# Patient Record
Sex: MALE | Race: WHITE | NOT HISPANIC OR LATINO | Employment: FULL TIME | URBAN - METROPOLITAN AREA
[De-identification: names, ages, dates, MRNs, and addresses within clinical notes are randomized per-mention and may not be internally consistent; named-entity substitution may affect disease eponyms.]

---

## 2020-10-14 ENCOUNTER — LAB (OUTPATIENT)
Dept: LAB | Facility: HOSPITAL | Age: 50
End: 2020-10-14

## 2020-10-14 ENCOUNTER — OFFICE VISIT (OUTPATIENT)
Dept: ENDOCRINOLOGY | Facility: CLINIC | Age: 50
End: 2020-10-14

## 2020-10-14 VITALS
SYSTOLIC BLOOD PRESSURE: 122 MMHG | BODY MASS INDEX: 26.96 KG/M2 | TEMPERATURE: 98 F | DIASTOLIC BLOOD PRESSURE: 84 MMHG | HEIGHT: 69 IN | HEART RATE: 78 BPM | WEIGHT: 182 LBS

## 2020-10-14 DIAGNOSIS — IMO0002 DIABETES MELLITUS TYPE 1, UNCONTROLLED, WITH COMPLICATIONS: ICD-10-CM

## 2020-10-14 DIAGNOSIS — E10.649 TYPE 1 DIABETES MELLITUS WITH HYPOGLYCEMIA AND WITHOUT COMA (HCC): Primary | ICD-10-CM

## 2020-10-14 DIAGNOSIS — Z96.41 PRESENCE OF INSULIN PUMP: ICD-10-CM

## 2020-10-14 LAB
ALBUMIN SERPL-MCNC: 4.5 G/DL (ref 3.5–5.2)
ALBUMIN UR-MCNC: <1.2 MG/DL
ALBUMIN/GLOB SERPL: 1.6 G/DL
ALP SERPL-CCNC: 75 U/L (ref 39–117)
ALT SERPL W P-5'-P-CCNC: 23 U/L (ref 1–41)
ANION GAP SERPL CALCULATED.3IONS-SCNC: 8.8 MMOL/L (ref 5–15)
AST SERPL-CCNC: 22 U/L (ref 1–40)
BILIRUB SERPL-MCNC: 1.1 MG/DL (ref 0–1.2)
BUN SERPL-MCNC: 14 MG/DL (ref 6–20)
BUN/CREAT SERPL: 13.3 (ref 7–25)
CALCIUM SPEC-SCNC: 9.3 MG/DL (ref 8.6–10.5)
CHLORIDE SERPL-SCNC: 101 MMOL/L (ref 98–107)
CHOLEST SERPL-MCNC: 174 MG/DL (ref 0–200)
CO2 SERPL-SCNC: 31.2 MMOL/L (ref 22–29)
CREAT SERPL-MCNC: 1.05 MG/DL (ref 0.76–1.27)
CREAT UR-MCNC: 161.9 MG/DL
DEPRECATED RDW RBC AUTO: 40.8 FL (ref 37–54)
ERYTHROCYTE [DISTWIDTH] IN BLOOD BY AUTOMATED COUNT: 13 % (ref 12.3–15.4)
EXPIRATION DATE: NORMAL
GFR SERPL CREATININE-BSD FRML MDRD: 75 ML/MIN/1.73
GLOBULIN UR ELPH-MCNC: 2.8 GM/DL
GLUCOSE SERPL-MCNC: 115 MG/DL (ref 65–99)
HBA1C MFR BLD: 5.9 %
HCT VFR BLD AUTO: 45.4 % (ref 37.5–51)
HDLC SERPL-MCNC: 55 MG/DL (ref 40–60)
HGB BLD-MCNC: 15.4 G/DL (ref 13–17.7)
LDLC SERPL CALC-MCNC: 105 MG/DL (ref 0–100)
LDLC/HDLC SERPL: 1.89 {RATIO}
Lab: NORMAL
MCH RBC QN AUTO: 29.5 PG (ref 26.6–33)
MCHC RBC AUTO-ENTMCNC: 33.9 G/DL (ref 31.5–35.7)
MCV RBC AUTO: 87 FL (ref 79–97)
MICROALBUMIN/CREAT UR: NORMAL MG/G{CREAT}
PLATELET # BLD AUTO: 262 10*3/MM3 (ref 140–450)
PMV BLD AUTO: 9.8 FL (ref 6–12)
POTASSIUM SERPL-SCNC: 3.8 MMOL/L (ref 3.5–5.2)
PROT SERPL-MCNC: 7.3 G/DL (ref 6–8.5)
RBC # BLD AUTO: 5.22 10*6/MM3 (ref 4.14–5.8)
SODIUM SERPL-SCNC: 141 MMOL/L (ref 136–145)
TRIGL SERPL-MCNC: 76 MG/DL (ref 0–150)
TSH SERPL DL<=0.05 MIU/L-ACNC: 1.38 UIU/ML (ref 0.27–4.2)
VLDLC SERPL-MCNC: 14 MG/DL (ref 5–40)
WBC # BLD AUTO: 7.09 10*3/MM3 (ref 3.4–10.8)

## 2020-10-14 PROCEDURE — 82570 ASSAY OF URINE CREATININE: CPT | Performed by: PHYSICIAN ASSISTANT

## 2020-10-14 PROCEDURE — 85027 COMPLETE CBC AUTOMATED: CPT | Performed by: PHYSICIAN ASSISTANT

## 2020-10-14 PROCEDURE — 83036 HEMOGLOBIN GLYCOSYLATED A1C: CPT | Performed by: PHYSICIAN ASSISTANT

## 2020-10-14 PROCEDURE — 82043 UR ALBUMIN QUANTITATIVE: CPT | Performed by: PHYSICIAN ASSISTANT

## 2020-10-14 PROCEDURE — 80053 COMPREHEN METABOLIC PANEL: CPT | Performed by: PHYSICIAN ASSISTANT

## 2020-10-14 PROCEDURE — 80061 LIPID PANEL: CPT | Performed by: PHYSICIAN ASSISTANT

## 2020-10-14 PROCEDURE — 84443 ASSAY THYROID STIM HORMONE: CPT | Performed by: PHYSICIAN ASSISTANT

## 2020-10-14 PROCEDURE — 99213 OFFICE O/P EST LOW 20 MIN: CPT | Performed by: PHYSICIAN ASSISTANT

## 2020-10-14 PROCEDURE — 36415 COLL VENOUS BLD VENIPUNCTURE: CPT

## 2020-10-14 NOTE — PROGRESS NOTES
"     Office Note      Date: 10/14/2020  Patient Name: Stuart Guerrero  MRN: [id number]  : 1970    Chief Complaint   Patient presents with   • Diabetes       History of Present Illness:   Stuart Guerrero is a 50 y.o. male who presents today for type 1 diabetes. He remains on Medtronic 530G pump. He denies any problems with the pump. He is testing 7-10x per day. He notes occasional hypoglycemia. He denies any severe hypoglycemia, but has had FSBS in the low 40s. He reports higher fasting FSBS and after meals.  He made some pump adjustments.  He denies any problems with his feet. Eye exam up to date.  He reports a little decrease in strength of urine stream. He says this is stable.  He has had prostate exam.      Subjective      Review of Systems:   Review of Systems   Constitutional: Negative for activity change, appetite change, chills, diaphoresis, fatigue, fever and unexpected weight change.   Cardiovascular: Negative for chest pain, palpitations and leg swelling.   Gastrointestinal: Negative for abdominal distention, abdominal pain, constipation, diarrhea, nausea and vomiting.   Endocrine: Negative for cold intolerance, heat intolerance, polydipsia, polyphagia and polyuria.   Genitourinary: Positive for difficulty urinating.       The following portions of the patient's history were reviewed and updated as appropriate: allergies, current medications, past family history, past medical history, past social history, past surgical history and problem list.    Objective     Vitals:    10/14/20 0841   BP: 122/84   BP Location: Left arm   Patient Position: Sitting   Cuff Size: Adult   Pulse: 78   Temp: 98 °F (36.7 °C)   TempSrc: Infrared   Weight: 82.6 kg (182 lb)   Height: 175.3 cm (69\")   PainSc: 0-No pain     Body mass index is 26.88 kg/m².    Physical Exam  Vitals signs reviewed.   Constitutional:       General: He is not in acute distress.     Appearance: Normal appearance.   Neurological:      " Mental Status: He is alert.   Psychiatric:         Mood and Affect: Mood and affect normal.         HEMOGLOBIN A1C  Lab Results   Component Value Date    HGBA1C 5.9 10/14/2020         Current Outpatient Medications   Medication Instructions   • glucose blood (Contour Next Test) test strip Test up to 9 times daily   • glucose blood test strip USE TO test blood sugar 8-9 times a DAY   • NovoLOG 100 UNIT/ML injection USE AS DIRECTED IN INSULIN PUMP -MAX 66 UNITS DAILY-       Assessment / Plan      Assessment & Plan:  1. Type 1 diabetes mellitus with hypoglycemia and without coma (CMS/formerly [facility])  A1c okay, but concern for frequent hypoglycemia with A1c this low. Surprised by A1c given higher readings than usual. Check CBC today to rule out anemia. Reviewed pump download and discussed with pt. Adjust ISF to 55 from 10p-2a. Rest of labs pending.  Will notify him of results.   - POC Glycosylated Hemoglobin (Hb A1C)  - Comprehensive Metabolic Panel; Future  - CBC (No Diff); Future  - TSH; Future  - Microalbumin / Creatinine Urine Ratio - Urine, Clean Catch; Future  - Lipid Panel; Future    2. Presence of insulin pump      Return in about 6 months (around 4/14/2021) for Next scheduled follow up.     REBECCA Bacon  10/14/2020

## 2020-10-15 RX ORDER — PERPHENAZINE 16 MG/1
TABLET, FILM COATED ORAL
Qty: 300 EACH | Refills: 11 | Status: SHIPPED | OUTPATIENT
Start: 2020-10-15 | End: 2021-09-13 | Stop reason: SDUPTHER

## 2020-10-22 PROBLEM — E10.9 TYPE 1 DIABETES MELLITUS WITHOUT COMPLICATION: Status: ACTIVE | Noted: 2020-10-22

## 2020-10-22 PROBLEM — Z96.41 PRESENCE OF INSULIN PUMP: Status: ACTIVE | Noted: 2020-10-22

## 2021-04-14 ENCOUNTER — OFFICE VISIT (OUTPATIENT)
Dept: ENDOCRINOLOGY | Facility: CLINIC | Age: 51
End: 2021-04-14

## 2021-04-14 VITALS
SYSTOLIC BLOOD PRESSURE: 130 MMHG | OXYGEN SATURATION: 97 % | WEIGHT: 188 LBS | HEIGHT: 69 IN | DIASTOLIC BLOOD PRESSURE: 76 MMHG | BODY MASS INDEX: 27.85 KG/M2 | TEMPERATURE: 97.1 F | HEART RATE: 60 BPM

## 2021-04-14 DIAGNOSIS — Z96.41 PRESENCE OF INSULIN PUMP: ICD-10-CM

## 2021-04-14 DIAGNOSIS — E10.649 TYPE 1 DIABETES MELLITUS WITH HYPOGLYCEMIA AND WITHOUT COMA (HCC): Primary | Chronic | ICD-10-CM

## 2021-04-14 PROBLEM — E10.9 TYPE 1 DIABETES MELLITUS WITHOUT COMPLICATION: Chronic | Status: ACTIVE | Noted: 2020-10-22

## 2021-04-14 LAB
EXPIRATION DATE: NORMAL
HBA1C MFR BLD: 6.2 %
Lab: NORMAL

## 2021-04-14 PROCEDURE — 83036 HEMOGLOBIN GLYCOSYLATED A1C: CPT | Performed by: PHYSICIAN ASSISTANT

## 2021-04-14 PROCEDURE — 99213 OFFICE O/P EST LOW 20 MIN: CPT | Performed by: PHYSICIAN ASSISTANT

## 2021-04-14 RX ORDER — GLUCAGON INJECTION, SOLUTION 1 MG/.2ML
1 INJECTION, SOLUTION SUBCUTANEOUS ONCE AS NEEDED
Qty: 0.4 ML | Refills: 0 | Status: SHIPPED | OUTPATIENT
Start: 2021-04-14

## 2021-04-14 RX ORDER — SUBCUTANEOUS INSULIN PUMP
EACH MISCELLANEOUS
COMMUNITY

## 2021-04-14 NOTE — PROGRESS NOTES
"     Office Note      Date: 2021  Patient Name: Stuart Guerrero  MRN: [id number]  : 1970    Chief Complaint   Patient presents with   • Diabetes       History of Present Illness:   Stuart Guerrero is a 50 y.o. male who presents today for type 1 diabetes.  He remains on Medtronic 530G pump.  Pump is out of warranty.  He is testing 7-10x per day.  He reports some hypoglycemia.  He denies any severe hypoglycemia, but has had readings in the 40s.  He does report some hypoglycemic unawareness.  He denies any problems with his feet.  Eye exam - he will check on date of last exam.  He reports developing N/V/D about 24h after wedding reception last weekend.  He thinks that it was food poisoning as others got sick as well.  He reports having trouble getting blood sugar up from the 40s - used syrup.  Then blood sugar went up to 200s and he had trouble keeping down for about a day.    Subjective      Review of Systems:   Review of Systems   Constitutional: Negative.    Cardiovascular: Negative.    Gastrointestinal: Positive for diarrhea, nausea and vomiting.        (resolved)   Endocrine: Negative.        The following portions of the patient's history were reviewed and updated as appropriate: allergies, current medications, past family history, past medical history, past social history, past surgical history and problem list.    Objective     Vitals:    21 0805   BP: 130/76   BP Location: Left arm   Patient Position: Sitting   Cuff Size: Adult   Pulse: 60   Temp: 97.1 °F (36.2 °C)   TempSrc: Infrared   SpO2: 97%   Weight: 85.3 kg (188 lb)   Height: 175.3 cm (69\")   PainSc: 0-No pain     Body mass index is 27.76 kg/m².    Physical Exam  Vitals reviewed.   Constitutional:       General: He is not in acute distress.  Cardiovascular:      Pulses:           Dorsalis pedis pulses are 2+ on the right side and 2+ on the left side.        Posterior tibial pulses are 2+ on the right side and 2+ on the left " side.   Musculoskeletal:      Right foot: No deformity.      Left foot: No deformity.   Feet:      Right foot:      Protective Sensation: 10 sites tested. 10 sites sensed.      Skin integrity: Skin integrity normal.      Toenail Condition: Right toenails are normal.      Left foot:      Protective Sensation: 10 sites tested. 10 sites sensed.      Skin integrity: Skin integrity normal.      Toenail Condition: Left toenails are normal.   Neurological:      Mental Status: He is alert and oriented to person, place, and time.   Psychiatric:         Mood and Affect: Affect normal.         HEMOGLOBIN A1C  Lab Results   Component Value Date    HGBA1C 6.2 04/14/2021    HGBA1C 5.9 10/14/2020       Current Outpatient Medications   Medication Instructions   • glucose blood (Contour Next Test) test strip Test up to 9 times daily   • Gvoke HypoPen 2-Pack 1 mg, Subcutaneous, Once As Needed   • Insulin Infusion Pump (MINIMED INSULIN PUMP) device MedOneTwoSee 530G/751: 12a 0.875 u/hr, 330a 0.875, 830a 0.55, 12p 0.475, 530p 0.5; carb ratio 12a 1:12 breakfast, 11a 1:14 lunch, 5p 1:12 dinner; target , ISF 45, AIT 3h   • NovoLOG 100 UNIT/ML injection MAX DAILY DOSE 66 UNITS VIA INSULIN PUMP       Assessment / Plan      Assessment & Plan:  1. Type 1 diabetes mellitus with hypoglycemia and without coma (CMS/[facility])  A1c at goal.  Reviewed pump download and discussed with patient.  He is having frequent hypoglycemia, particularly around noon.  Decrease basal at 8:30 AM to 0.55.  Pump is out of warranty.  He would benefit from CGM and hybrid closed-loop system.  We discussed available insulin pumps/CGM/hybrid closed-loop systems.  He is not interested in OmniPod, so MedOneTwoSee 770G system or Tandem Control-IQ system.  He will consider the options.  - POC Glycosylated Hemoglobin (Hb A1C)  - Glucagon (Gvoke HypoPen 2-Pack) 1 MG/0.2ML solution auto-injector; Inject 1 mg under the skin into the appropriate area as directed 1 (One) Time As Needed  (for severe hypoglycemia) for up to 1 dose.  Dispense: 0.4 mL; Refill: 0    2. Presence of insulin pump      Return in about 6 months (around 10/14/2021) for Fasting follow up.     REBECCA Bacon  Endocrinology  04/14/2021

## 2021-09-13 ENCOUNTER — TELEPHONE (OUTPATIENT)
Dept: ENDOCRINOLOGY | Facility: CLINIC | Age: 51
End: 2021-09-13

## 2021-09-13 RX ORDER — PERPHENAZINE 16 MG/1
TABLET, FILM COATED ORAL
Qty: 300 EACH | Refills: 11 | Status: SHIPPED | OUTPATIENT
Start: 2021-09-13 | End: 2022-06-20 | Stop reason: SDUPTHER

## 2021-09-13 NOTE — TELEPHONE ENCOUNTER
REMBERTO PT. AND ADVISED SHE IS GOING TO CHECK FASTING LABS, HE STATED HE HAD LIPIDS AND CMP DONE TODAY WITH PRIMARY CARE DOCTOR, I ASKED HIM TO HAVE THOSE RESULTS SENT TO OUR OFFICE SO WE DO NOT REPEAT THINGS THAT HAVE ALREADY BEEN DONE. PT. VOICED UNDERSTANDING

## 2021-09-13 NOTE — TELEPHONE ENCOUNTER
Pt called requesting his prescription to be sent to [facility] Contour Next Test Strips Novolog 100 unit/mL injection. Pt also wants to know what labs he is having done for his visit on 12/06/22 pt seeing primary soon don't want to repeat any labs. Please notify pt. Pt last seen 04/14/21

## 2021-12-06 ENCOUNTER — OFFICE VISIT (OUTPATIENT)
Dept: ENDOCRINOLOGY | Facility: CLINIC | Age: 51
End: 2021-12-06

## 2021-12-06 ENCOUNTER — LAB (OUTPATIENT)
Dept: LAB | Facility: HOSPITAL | Age: 51
End: 2021-12-06

## 2021-12-06 VITALS
SYSTOLIC BLOOD PRESSURE: 124 MMHG | HEART RATE: 71 BPM | DIASTOLIC BLOOD PRESSURE: 76 MMHG | WEIGHT: 191 LBS | OXYGEN SATURATION: 97 % | BODY MASS INDEX: 28.29 KG/M2 | HEIGHT: 69 IN

## 2021-12-06 DIAGNOSIS — E10.65 TYPE 1 DIABETES MELLITUS WITH HYPERGLYCEMIA (HCC): Primary | Chronic | ICD-10-CM

## 2021-12-06 DIAGNOSIS — E10.65 TYPE 1 DIABETES MELLITUS WITH HYPERGLYCEMIA (HCC): Chronic | ICD-10-CM

## 2021-12-06 DIAGNOSIS — Z96.41 PRESENCE OF INSULIN PUMP: ICD-10-CM

## 2021-12-06 DIAGNOSIS — E10.649 TYPE 1 DIABETES MELLITUS WITH HYPOGLYCEMIA AND WITHOUT COMA (HCC): ICD-10-CM

## 2021-12-06 LAB
EXPIRATION DATE: ABNORMAL
EXPIRATION DATE: NORMAL
GLUCOSE BLDC GLUCOMTR-MCNC: 62 MG/DL (ref 70–130)
HBA1C MFR BLD: 6 %
Lab: ABNORMAL
Lab: NORMAL

## 2021-12-06 PROCEDURE — 83036 HEMOGLOBIN GLYCOSYLATED A1C: CPT | Performed by: PHYSICIAN ASSISTANT

## 2021-12-06 PROCEDURE — 82947 ASSAY GLUCOSE BLOOD QUANT: CPT | Performed by: PHYSICIAN ASSISTANT

## 2021-12-06 PROCEDURE — 82570 ASSAY OF URINE CREATININE: CPT

## 2021-12-06 PROCEDURE — 82043 UR ALBUMIN QUANTITATIVE: CPT

## 2021-12-06 PROCEDURE — 84443 ASSAY THYROID STIM HORMONE: CPT

## 2021-12-06 PROCEDURE — 99213 OFFICE O/P EST LOW 20 MIN: CPT | Performed by: PHYSICIAN ASSISTANT

## 2021-12-06 PROCEDURE — 80053 COMPREHEN METABOLIC PANEL: CPT

## 2021-12-06 RX ORDER — INSULIN GLARGINE 100 [IU]/ML
INJECTION, SOLUTION SUBCUTANEOUS
Qty: 10 ML | Refills: 1 | Status: SHIPPED | OUTPATIENT
Start: 2021-12-06

## 2021-12-06 NOTE — PROGRESS NOTES
"     Office Note      Date: 2021  Patient Name: Stuart Guerrero  MRN: [id number]  : 1970    Chief Complaint   Patient presents with   • Diabetes       History of Present Illness:   Stuart Guerrero is a 51 y.o. male who presents today for follow up on type 1 diabetes.  Known diabetic complications: none.  He remains on Medtronic 530G pump.  Pump is out of warranty.  He is planning to change to Tandem pump with Dexcom G6 CGM and Control-IQ program.  He is testing blood sugars 7-10x per day.  He reports some hypoglycemia.  He denies any severe hypoglycemia, but has had readings in the 40s, lowest 37 rare occasions.  He does have hypoglycemic unawareness.  Current diet: in general, a \"healthy\" diet    Current exercise: intermittently, but stays active and does strength training.  Feet: No sores. He reports intermittent pain and hyperesthesia bilateral 2nd toes.  This occurred for about a month, resolved about 2 weeks ago.  He reports that he was having some unusually high blood sugars during that time.  Eye exam current (within one year): no, due. No retinopathy.  ACE inhibitor/ARB: Not Indicated.  Statin: He chooses not to take statin.  He had lipids checked in September.  Total cholesterol 174, , HDL 45, triglycerides 93.    Subjective      Review of Systems:   Review of Systems   Constitutional: Negative.    Cardiovascular: Negative.    Gastrointestinal: Negative.    Endocrine: Negative.        The following portions of the patient's history were reviewed and updated as appropriate: allergies, current medications, past family history, past medical history, past social history, past surgical history and problem list.    Objective     Vitals:    21 0859   BP: 124/76   Pulse: 71   SpO2: 97%   Weight: 86.6 kg (191 lb)   Height: 175.3 cm (69\")     Body mass index is 28.21 kg/m².    Physical Exam  Vitals reviewed.   Constitutional:       General: He is not in acute " distress.  Cardiovascular:      Pulses:           Dorsalis pedis pulses are 2+ on the right side and 2+ on the left side.        Posterior tibial pulses are 2+ on the right side and 2+ on the left side.   Musculoskeletal:      Right foot: No deformity.      Left foot: No deformity.   Feet:      Right foot:      Protective Sensation: 8 sites tested. 8 sites sensed.      Skin integrity: Skin integrity normal.      Toenail Condition: Right toenails are normal.      Left foot:      Protective Sensation: 8 sites tested. 8 sites sensed.      Skin integrity: Skin integrity normal.      Toenail Condition: Left toenails are normal.      Comments:   Neurological:      Mental Status: He is alert and oriented to person, place, and time.   Psychiatric:         Mood and Affect: Affect normal.         HEMOGLOBIN A1C  Lab Results   Component Value Date    HGBA1C 6.0 12/06/2021    HGBA1C 6.2 04/14/2021    HGBA1C 5.9 10/14/2020     GLUCOSE  Lab Results   Component Value Date    POCGLU 62 (A) 12/06/2021     CMP  Lab Results   Component Value Date    GLUCOSE 115 (H) 10/14/2020    BUN 14 10/14/2020    CREATININE 1.05 10/14/2020    EGFRIFNONA 75 10/14/2020    BCR 13.3 10/14/2020     10/14/2020    K 3.8 10/14/2020    CO2 31.2 (H) 10/14/2020    CALCIUM 9.3 10/14/2020    ALBUMIN 4.50 10/14/2020    BILITOT 1.1 10/14/2020    ALKPHOS 75 10/14/2020    AST 22 10/14/2020    ALT 23 10/14/2020     LIPID PANEL  Lab Results   Component Value Date    CHOL 174 10/14/2020    TRIG 76 10/14/2020    HDL 55 10/14/2020     (H) 10/14/2020     URINE MICROALBUMIN/CREATININE RATIO  Lab Results   Component Value Date    MALBCRERATIO  10/14/2020      Comment:      Unable to calculate     THYROID  Lab Results   Component Value Date    TSH 1.380 10/14/2020       Current Outpatient Medications   Medication Instructions   • Contour Next Test test strip Test up to 9 times daily   • Gvoke HypoPen 2-Pack 1 mg, Subcutaneous, Once As Needed   • Insulin  Infusion Pump (MINIMED INSULIN PUMP) device Medtronic 530G/751: 12a 0.875 u/hr, 330a 0.875, 830a 0.55, 12p 0.475, 530p 0.5; carb ratio 12a 1:12 breakfast, 11a 1:14 lunch, 5p 1:12 dinner; target , ISF 45, AIT 3h   • NovoLOG 100 UNIT/ML injection MAX DAILY DOSE 66 UNITS VIA INSULIN PUMP       Assessment / Plan      Assessment & Plan:  1. Type 1 diabetes mellitus with hyperglycemia (HCC)  A1c at goal.  He is having frequent mild hypoglycemia.  He likes to keep his blood sugars under very tight control.  He would definitely benefit from CGM.  Plan is to change to Tandem pump and Dexcom G6 CGM.  BP okay.  Backup in case of pump failure - he has syringes at home and will send in Rx for basal insulin today.  Glucose was low at 62 when he was being checked in.  He had a snack of peanut butter and crackers.  Refused blood sugar recheck in office and stated he would check with his glucometer prior to driving.  - POC Glucose, Blood  - POC Glycosylated Hemoglobin (Hb A1C)  - Microalbumin / Creatinine Urine Ratio - Urine, Clean Catch; Future  - Comprehensive Metabolic Panel; Future  - TSH; Future    2. Type 1 diabetes mellitus with hypoglycemia and without coma (HCC)  Plan to start CGM, hybrid closed-loop system.    3. Presence of insulin pump    Will notify him of pending lab results from today.    Return in about 6 months (around 6/6/2022) for recheck with A1c. He was advised to contact the office with any interval questions or concerns.    REBECCA Bacon  Endocrinology  12/06/2021

## 2021-12-07 LAB
ALBUMIN SERPL-MCNC: 4.4 G/DL (ref 3.5–5.2)
ALBUMIN UR-MCNC: <1.2 MG/DL
ALBUMIN/GLOB SERPL: 1.7 G/DL
ALP SERPL-CCNC: 76 U/L (ref 39–117)
ALT SERPL W P-5'-P-CCNC: 20 U/L (ref 1–41)
ANION GAP SERPL CALCULATED.3IONS-SCNC: 9.2 MMOL/L (ref 5–15)
AST SERPL-CCNC: 26 U/L (ref 1–40)
BILIRUB SERPL-MCNC: 0.7 MG/DL (ref 0–1.2)
BUN SERPL-MCNC: 20 MG/DL (ref 6–20)
BUN/CREAT SERPL: 17.9 (ref 7–25)
CALCIUM SPEC-SCNC: 9.4 MG/DL (ref 8.6–10.5)
CHLORIDE SERPL-SCNC: 105 MMOL/L (ref 98–107)
CO2 SERPL-SCNC: 31.8 MMOL/L (ref 22–29)
CREAT SERPL-MCNC: 1.12 MG/DL (ref 0.76–1.27)
CREAT UR-MCNC: 110.8 MG/DL
GFR SERPL CREATININE-BSD FRML MDRD: 69 ML/MIN/1.73
GLOBULIN UR ELPH-MCNC: 2.6 GM/DL
GLUCOSE SERPL-MCNC: 102 MG/DL (ref 65–99)
MICROALBUMIN/CREAT UR: NORMAL MG/G{CREAT}
POTASSIUM SERPL-SCNC: 4.7 MMOL/L (ref 3.5–5.2)
PROT SERPL-MCNC: 7 G/DL (ref 6–8.5)
SODIUM SERPL-SCNC: 146 MMOL/L (ref 136–145)
TSH SERPL DL<=0.05 MIU/L-ACNC: 2.53 UIU/ML (ref 0.27–4.2)

## 2022-06-20 ENCOUNTER — OFFICE VISIT (OUTPATIENT)
Dept: ENDOCRINOLOGY | Facility: CLINIC | Age: 52
End: 2022-06-20

## 2022-06-20 VITALS
BODY MASS INDEX: 28.29 KG/M2 | OXYGEN SATURATION: 98 % | WEIGHT: 191 LBS | DIASTOLIC BLOOD PRESSURE: 80 MMHG | HEART RATE: 58 BPM | HEIGHT: 69 IN | SYSTOLIC BLOOD PRESSURE: 120 MMHG

## 2022-06-20 DIAGNOSIS — E10.649 TYPE 1 DIABETES MELLITUS WITH HYPOGLYCEMIA AND WITHOUT COMA: ICD-10-CM

## 2022-06-20 DIAGNOSIS — Z96.41 PRESENCE OF INSULIN PUMP: ICD-10-CM

## 2022-06-20 DIAGNOSIS — E10.65 TYPE 1 DIABETES MELLITUS WITH HYPERGLYCEMIA: Primary | Chronic | ICD-10-CM

## 2022-06-20 LAB
EXPIRATION DATE: NORMAL
EXPIRATION DATE: NORMAL
GLUCOSE BLDC GLUCOMTR-MCNC: 124 MG/DL (ref 70–130)
HBA1C MFR BLD: 6.2 %
Lab: NORMAL
Lab: NORMAL

## 2022-06-20 PROCEDURE — 83036 HEMOGLOBIN GLYCOSYLATED A1C: CPT | Performed by: PHYSICIAN ASSISTANT

## 2022-06-20 PROCEDURE — 82947 ASSAY GLUCOSE BLOOD QUANT: CPT | Performed by: PHYSICIAN ASSISTANT

## 2022-06-20 PROCEDURE — 99213 OFFICE O/P EST LOW 20 MIN: CPT | Performed by: PHYSICIAN ASSISTANT

## 2022-06-20 RX ORDER — INSULIN ASPART 100 [IU]/ML
INJECTION, SOLUTION INTRAVENOUS; SUBCUTANEOUS
Qty: 20 ML | Refills: 11 | Status: SHIPPED | OUTPATIENT
Start: 2022-06-20

## 2022-06-20 RX ORDER — PERPHENAZINE 16 MG/1
TABLET, FILM COATED ORAL
Qty: 300 EACH | Refills: 11 | Status: SHIPPED | OUTPATIENT
Start: 2022-06-20

## 2022-06-20 RX ORDER — BLOOD-GLUCOSE METER, WIRELESS
1 KIT MISCELLANEOUS TAKE AS DIRECTED
Qty: 1 KIT | Refills: 0 | Status: SHIPPED | OUTPATIENT
Start: 2022-06-20

## 2022-09-23 ENCOUNTER — TELEPHONE (OUTPATIENT)
Dept: ENDOCRINOLOGY | Facility: CLINIC | Age: 52
End: 2022-09-23

## 2022-09-23 NOTE — TELEPHONE ENCOUNTER
PATIENT CALLED TO SEE IF THERE ARE SAMPLES FOR HIS TANDEM PUMP. HE STATES THAT HE IS SWITCHING FROM MEDTRONIC TO TANDEM AND INSURANCE WILL NOT COVER SUPPLIES FOR TANDEM AT THIS TIME AS PATIENT RECENTLY ORDERED MEDTRONIC SUPPLIES.    CALL BACK [phone]

## 2022-10-01 ENCOUNTER — TELEPHONE (OUTPATIENT)
Dept: ENDOCRINOLOGY | Facility: CLINIC | Age: 52
End: 2022-10-01

## 2022-10-02 NOTE — TELEPHONE ENCOUNTER
Called with pump malfunction   Has basaglar- instructions previously to take 17 u daily   Discussed using novolog / pump insulin for pre meal and reviewed I:C of 1:15 and correction 50 (2 u /100 > 120)  He will call back if problems and has training new pump set up for next week  Thanks, [facility]

## 2023-04-11 ENCOUNTER — TELEPHONE (OUTPATIENT)
Dept: ENDOCRINOLOGY | Facility: CLINIC | Age: 53
End: 2023-04-11
Payer: COMMERCIAL

## 2023-04-11 RX ORDER — PROCHLORPERAZINE 25 MG/1
1 SUPPOSITORY RECTAL
Qty: 1 EACH | Refills: 0 | Status: SHIPPED | OUTPATIENT
Start: 2023-04-11 | End: 2023-04-18 | Stop reason: SDUPTHER

## 2023-04-11 RX ORDER — PROCHLORPERAZINE 25 MG/1
SUPPOSITORY RECTAL
Qty: 3 EACH | Refills: 0 | Status: SHIPPED | OUTPATIENT
Start: 2023-04-11 | End: 2023-04-18 | Stop reason: SDUPTHER

## 2023-04-11 NOTE — TELEPHONE ENCOUNTER
PATIENT IS REQUESTING RX FOR DEXCOM TRANSMITTER AND SENSORS TO BE SENT TO [facility] IN [place], [place].

## 2023-04-18 ENCOUNTER — OFFICE VISIT (OUTPATIENT)
Dept: ENDOCRINOLOGY | Facility: CLINIC | Age: 53
End: 2023-04-18
Payer: COMMERCIAL

## 2023-04-18 VITALS
SYSTOLIC BLOOD PRESSURE: 111 MMHG | HEIGHT: 69 IN | HEART RATE: 62 BPM | WEIGHT: 189 LBS | OXYGEN SATURATION: 97 % | DIASTOLIC BLOOD PRESSURE: 68 MMHG | BODY MASS INDEX: 27.99 KG/M2

## 2023-04-18 DIAGNOSIS — Z96.41 PRESENCE OF INSULIN PUMP: ICD-10-CM

## 2023-04-18 DIAGNOSIS — E10.649 TYPE 1 DIABETES MELLITUS WITH HYPOGLYCEMIA AND WITHOUT COMA: Primary | ICD-10-CM

## 2023-04-18 LAB
ALBUMIN SERPL-MCNC: 4.5 G/DL (ref 3.5–5.2)
ALBUMIN UR-MCNC: <1.2 MG/DL
ALBUMIN/GLOB SERPL: 1.7 G/DL
ALP SERPL-CCNC: 75 U/L (ref 39–117)
ALT SERPL W P-5'-P-CCNC: 22 U/L (ref 1–41)
ANION GAP SERPL CALCULATED.3IONS-SCNC: 11.4 MMOL/L (ref 5–15)
AST SERPL-CCNC: 29 U/L (ref 1–40)
BILIRUB SERPL-MCNC: 0.9 MG/DL (ref 0–1.2)
BUN SERPL-MCNC: 21 MG/DL (ref 6–20)
BUN/CREAT SERPL: 20.4 (ref 7–25)
CALCIUM SPEC-SCNC: 9.5 MG/DL (ref 8.6–10.5)
CHLORIDE SERPL-SCNC: 105 MMOL/L (ref 98–107)
CHOLEST SERPL-MCNC: 183 MG/DL (ref 0–200)
CO2 SERPL-SCNC: 26.6 MMOL/L (ref 22–29)
CREAT SERPL-MCNC: 1.03 MG/DL (ref 0.76–1.27)
CREAT UR-MCNC: 126.4 MG/DL
EGFRCR SERPLBLD CKD-EPI 2021: 87.9 ML/MIN/1.73
EXPIRATION DATE: NORMAL
EXPIRATION DATE: NORMAL
GLOBULIN UR ELPH-MCNC: 2.7 GM/DL
GLUCOSE BLDC GLUCOMTR-MCNC: 123 MG/DL (ref 70–130)
GLUCOSE SERPL-MCNC: 116 MG/DL (ref 65–99)
HBA1C MFR BLD: 5.9 %
HDLC SERPL-MCNC: 54 MG/DL (ref 40–60)
LDLC SERPL CALC-MCNC: 118 MG/DL (ref 0–100)
LDLC/HDLC SERPL: 2.17 {RATIO}
Lab: NORMAL
Lab: NORMAL
MICROALBUMIN/CREAT UR: NORMAL MG/G{CREAT}
POTASSIUM SERPL-SCNC: 4.6 MMOL/L (ref 3.5–5.2)
PROT SERPL-MCNC: 7.2 G/DL (ref 6–8.5)
SODIUM SERPL-SCNC: 143 MMOL/L (ref 136–145)
TRIGL SERPL-MCNC: 58 MG/DL (ref 0–150)
TSH SERPL DL<=0.05 MIU/L-ACNC: 2.07 UIU/ML (ref 0.27–4.2)
VLDLC SERPL-MCNC: 11 MG/DL (ref 5–40)

## 2023-04-18 PROCEDURE — 80061 LIPID PANEL: CPT | Performed by: PHYSICIAN ASSISTANT

## 2023-04-18 PROCEDURE — 83036 HEMOGLOBIN GLYCOSYLATED A1C: CPT | Performed by: PHYSICIAN ASSISTANT

## 2023-04-18 PROCEDURE — 84443 ASSAY THYROID STIM HORMONE: CPT | Performed by: PHYSICIAN ASSISTANT

## 2023-04-18 PROCEDURE — 82043 UR ALBUMIN QUANTITATIVE: CPT | Performed by: PHYSICIAN ASSISTANT

## 2023-04-18 PROCEDURE — 95251 CONT GLUC MNTR ANALYSIS I&R: CPT | Performed by: PHYSICIAN ASSISTANT

## 2023-04-18 PROCEDURE — 99213 OFFICE O/P EST LOW 20 MIN: CPT | Performed by: PHYSICIAN ASSISTANT

## 2023-04-18 PROCEDURE — 80053 COMPREHEN METABOLIC PANEL: CPT | Performed by: PHYSICIAN ASSISTANT

## 2023-04-18 PROCEDURE — 82570 ASSAY OF URINE CREATININE: CPT | Performed by: PHYSICIAN ASSISTANT

## 2023-04-18 RX ORDER — SUBCUTANEOUS INSULIN PUMP
EACH MISCELLANEOUS
COMMUNITY

## 2023-04-18 RX ORDER — PROCHLORPERAZINE 25 MG/1
1 SUPPOSITORY RECTAL
Qty: 1 EACH | Refills: 3 | Status: SHIPPED | OUTPATIENT
Start: 2023-04-18

## 2023-04-18 RX ORDER — INSULIN ASPART 100 [IU]/ML
INJECTION, SOLUTION INTRAVENOUS; SUBCUTANEOUS
Qty: 20 ML | Refills: 11 | Status: SHIPPED | OUTPATIENT
Start: 2023-04-18

## 2023-04-18 RX ORDER — PERPHENAZINE 16 MG/1
TABLET, FILM COATED ORAL
Qty: 100 EACH | Refills: 11 | Status: SHIPPED | OUTPATIENT
Start: 2023-04-18

## 2023-04-18 RX ORDER — PROCHLORPERAZINE 25 MG/1
SUPPOSITORY RECTAL
Qty: 9 EACH | Refills: 3 | Status: SHIPPED | OUTPATIENT
Start: 2023-04-18

## 2023-04-18 NOTE — PROGRESS NOTES
"     Office Note      Date: 2023  Patient Name: Stuart Guerrero  MRN: [id number]  : 1970    Chief Complaint   Patient presents with   • Diabetes       History of Present Illness:   Stuart Guerrero is a 52 y.o. male who presents for follow-up for type 1 diabetes diagnosed in .  He reports he switched over to the tandem insulin pump back in the fall and is using the Dexcom G6 continuous glucose monitor.  He reports he is using auto mode but often overrides this because he is frustrated with the higher blood sugar readings.  He reports he does like the Dexcom continuous glucose monitor rather than checking his blood glucose 7-10 times daily.  He reports he is constantly changing his pump settings if he noticed any patterns on the Dexcom.  He he was happy to hear his A1c today is 5.9%.  He does have some hypoglycemia but denies severe or frequent hypoglycemia.  He had his eye exam May 2022 and has received reminders recently to check back in.  He denies any trouble with his feet today.  He is fasting today for labs.      Subjective     Review of Systems:   Review of Systems   Constitutional: Negative.    Cardiovascular: Negative.    Gastrointestinal: Negative.    Endocrine: Negative.    Neurological: Negative.        The following portions of the patient's history were reviewed and updated as appropriate: allergies, current medications, past family history, past medical history, past social history, past surgical history and problem list.    Objective     Vitals:    23 0926   BP: 111/68   Pulse: 62   SpO2: 97%   Weight: 85.7 kg (189 lb)   Height: 175.3 cm (69\")     Body mass index is 27.91 kg/m².    Physical Exam  Vitals reviewed.   Constitutional:       General: He is not in acute distress.     Appearance: Normal appearance.   Neurological:      Mental Status: He is alert.         HEMOGLOBIN A1C  Lab Results   Component Value Date    HGBA1C 5.9 2023       GLUCOSE  Glucose   Date " Value Ref Range Status   04/18/2023 123 70 - 130 mg/dL Final           Assessment / Plan      Assessment & Plan:  1. Type 1 diabetes mellitus with hypoglycemia and without coma  His hemoglobin A1c is excellent at 5.9%.  I reviewed his insulin pump download with continuous glucose monitor readings and he does tend to have some more low blood sugars in the late afternoons and evenings.  We discussed being less aggressive with his corrections around this time.  We did not make any changes in his pump settings today.  I refilled his Dexcom supplies, test strips and NovoLog insulin today.  Fasting labs pending today.  Will send note with results and plan.  - POC Glucose, Blood  - POC Glycosylated Hemoglobin (Hb A1C)  - Contour Next Test test strip; Test up to 3 times daily  Dispense: 100 each; Refill: 11  - NovoLOG 100 UNIT/ML injection; MAX DAILY DOSE 66 UNITS VIA INSULIN PUMP  Dispense: 20 mL; Refill: 11  - TSH; Future  - Microalbumin / Creatinine Urine Ratio - Urine, Clean Catch; Future  - Comprehensive Metabolic Panel; Future  - Lipid Panel; Future  - TSH  - Microalbumin / Creatinine Urine Ratio - Urine, Clean Catch  - Comprehensive Metabolic Panel  - Lipid Panel    2. Presence of insulin pump      Return in about 6 months (around 10/18/2023) for Recheck usually sees Ellyn Slade.     Angela Rivera PA-C  04/18/2023

## 2023-04-26 ENCOUNTER — TELEPHONE (OUTPATIENT)
Dept: ENDOCRINOLOGY | Facility: CLINIC | Age: 53
End: 2023-04-26
Payer: COMMERCIAL

## 2024-01-05 DIAGNOSIS — E10.649 TYPE 1 DIABETES MELLITUS WITH HYPOGLYCEMIA AND WITHOUT COMA: ICD-10-CM

## 2024-01-05 RX ORDER — INSULIN GLARGINE 100 [IU]/ML
INJECTION, SOLUTION SUBCUTANEOUS
Qty: 10 ML | Refills: 1 | Status: SHIPPED | OUTPATIENT
Start: 2024-01-05

## 2024-01-05 RX ORDER — INSULIN ASPART 100 [IU]/ML
INJECTION, SOLUTION INTRAVENOUS; SUBCUTANEOUS
Qty: 20 ML | Refills: 11 | Status: SHIPPED | OUTPATIENT
Start: 2024-01-05

## 2024-01-05 RX ORDER — PROCHLORPERAZINE 25 MG/1
SUPPOSITORY RECTAL
Qty: 3 EACH | Refills: 0 | Status: SHIPPED | OUTPATIENT
Start: 2024-01-05

## 2024-01-05 RX ORDER — PERPHENAZINE 16 MG/1
TABLET, FILM COATED ORAL
Qty: 100 EACH | Refills: 11 | Status: SHIPPED | OUTPATIENT
Start: 2024-01-05

## 2024-01-05 NOTE — TELEPHONE ENCOUNTER
Rx Refill Note  Requested Prescriptions     Pending Prescriptions Disp Refills    insulin glargine (Lantus) 100 UNIT/ML injection 10 mL 1     Sig: PRN off insulin pump, 17 units once daily    NovoLOG 100 UNIT/ML injection 20 mL 11     Sig: MAX DAILY DOSE 66 UNITS VIA INSULIN PUMP     Signed Prescriptions Disp Refills    Continuous Blood Gluc Sensor (Dexcom G6 Sensor) 3 each 0     Sig: Use Every 10 (Ten) Days.     Authorizing Provider: KAYLYN SUAREZ     Ordering User: PRAMOD HANNAH    Contour Next Test test strip 100 each 11     Sig: Test up to 3 times daily     Authorizing Provider: KAYLYN SUAREZ     Ordering User: PRAMOD HANNAH      Last office visit with prescribing clinician: 4/18/2023     Next office visit with prescribing clinician: 1/9/2024       Pramod Hannah MA  01/05/24, 11:25 EST

## 2024-01-09 ENCOUNTER — OFFICE VISIT (OUTPATIENT)
Dept: ENDOCRINOLOGY | Facility: CLINIC | Age: 54
End: 2024-01-09
Payer: COMMERCIAL

## 2024-01-09 VITALS
DIASTOLIC BLOOD PRESSURE: 88 MMHG | OXYGEN SATURATION: 97 % | SYSTOLIC BLOOD PRESSURE: 142 MMHG | HEIGHT: 69 IN | BODY MASS INDEX: 28.14 KG/M2 | HEART RATE: 64 BPM | WEIGHT: 190 LBS

## 2024-01-09 DIAGNOSIS — E10.649 TYPE 1 DIABETES MELLITUS WITH HYPOGLYCEMIA AND WITHOUT COMA: Primary | ICD-10-CM

## 2024-01-09 DIAGNOSIS — E78.00 ELEVATED LDL CHOLESTEROL LEVEL: ICD-10-CM

## 2024-01-09 DIAGNOSIS — Z96.41 PRESENCE OF INSULIN PUMP: ICD-10-CM

## 2024-01-09 DIAGNOSIS — R03.0 ELEVATED BLOOD PRESSURE READING: ICD-10-CM

## 2024-01-09 LAB
EXPIRATION DATE: ABNORMAL
EXPIRATION DATE: NORMAL
GLUCOSE BLDC GLUCOMTR-MCNC: 96 MG/DL (ref 70–130)
HBA1C MFR BLD: 5.9 % (ref 4.5–5.7)
Lab: ABNORMAL
Lab: NORMAL

## 2024-01-09 RX ORDER — PROCHLORPERAZINE 25 MG/1
SUPPOSITORY RECTAL
Qty: 9 EACH | Refills: 3 | Status: SHIPPED | OUTPATIENT
Start: 2024-01-09

## 2024-01-09 RX ORDER — PROCHLORPERAZINE 25 MG/1
1 SUPPOSITORY RECTAL
Qty: 1 EACH | Refills: 3 | Status: SHIPPED | OUTPATIENT
Start: 2024-01-09

## 2024-01-09 RX ORDER — INSULIN ASPART 100 [IU]/ML
INJECTION, SOLUTION INTRAVENOUS; SUBCUTANEOUS
Qty: 60 ML | Refills: 3 | Status: SHIPPED | OUTPATIENT
Start: 2024-01-09

## 2024-01-09 NOTE — PROGRESS NOTES
"     Office Note      Date: 2024  Patient Name: Stuart Guerrero  MRN: [id number]  : 1970    Chief Complaint   Patient presents with    Diabetes     Type I       History of Present Illness:   Stuart Guerrero is a 53 y.o. male who presents for follow-up for type 1 diabetes diagnosed in .  He remains on the tandem insulin pump with Dexcom G6 continuous glucose monitor.  He reports overall he is doing well.  He makes frequent adjustments to his pump settings to try to get his diabetes under better control.  He reports he does have some hypoglycemia but typically this is because he becomes impatient and overcorrects.  He reports he is due for his eye exam and will get this scheduled.  We did fasting labs at his appointment in April and these were okay except for elevated LDL cholesterol.  He is not interested in considering a statin at this time.  He prefers to work on healthy eating habits.        Subjective     Review of Systems:   Review of Systems   Constitutional: Negative.    Cardiovascular: Negative.    Gastrointestinal: Negative.    Endocrine: Negative.    Neurological: Negative.        The following portions of the patient's history were reviewed and updated as appropriate: allergies, current medications, past family history, past medical history, past social history, past surgical history, and problem list.    Objective     Vitals:    24 0801   BP: 142/88   BP Location: Left arm   Patient Position: Sitting   Cuff Size: Adult   Pulse: 64   SpO2: 97%   Weight: 86.2 kg (190 lb)   Height: 175.3 cm (69\")     Body mass index is 28.06 kg/m².    Physical Exam  Vitals reviewed.   Constitutional:       General: He is not in acute distress.     Appearance: Normal appearance.   Neurological:      Mental Status: He is alert.         HEMOGLOBIN A1C  Lab Results   Component Value Date    HGBA1C 5.9 (A) 2024       GLUCOSE  Glucose   Date Value Ref Range Status   2024 96 70 - 130 " mg/dL Final       CMP  Lab Results   Component Value Date    GLUCOSE 116 (H) 04/18/2023    BUN 21 (H) 04/18/2023    CREATININE 1.03 04/18/2023    EGFRIFNONA 69 12/06/2021    BCR 20.4 04/18/2023    K 4.6 04/18/2023    CO2 26.6 04/18/2023    CALCIUM 9.5 04/18/2023    AST 29 04/18/2023    ALT 22 04/18/2023       LIPID PANEL  Lab Results   Component Value Date    CHOL 183 04/18/2023    TRIG 58 04/18/2023    HDL 54 04/18/2023     (H) 04/18/2023       URINE MICROALBUMIN/CREATININE RATIO  Microalbumin/Creatinine Ratio   Date Value Ref Range Status   04/18/2023   Final     Comment:     Unable to calculate       TSH  Lab Results   Component Value Date    TSH 2.070 04/18/2023       Assessment / Plan      Assessment & Plan:  1. Type 1 diabetes mellitus with hypoglycemia and without coma  His hemoglobin A1c today is excellent at 5.9%.  I reviewed his tandem insulin pump download with continuous glucose monitor readings.  For the last 2 weeks his average glucose has been 121 mg/dL with 84% of his blood glucose readings within range, 11% high, 0% very high, 5% low and 1% very low.  We did not make any changes to his pump settings today.  We discussed being less aggressive with corrections and being patient and his blood glucose is elevated.  I encouraged him to schedule an eye exam.  We discussed the importance of annual eye exams with diabetes.  His weight is stable.  I encouraged continued healthy eating habits and physical activity as tolerated.  We will plan to do his foot exam as well as fasting labs next visit for monitoring.  I refilled his NovoLog and sensors today.  He is using a new mail order pharmacy.  - POC Glucose, Blood  - POC Glycosylated Hemoglobin (Hb A1C)  - NovoLOG 100 UNIT/ML injection; MAX DAILY DOSE 66 UNITS VIA INSULIN PUMP  Dispense: 60 mL; Refill: 3    2. Elevated blood pressure reading  His blood pressure is elevated today.  This is unusual for him.  He reports he has been under some increased  stress recently and had a lot of sodium with dinner last night.  He will monitor his blood pressure at home and reach out to me if this remains above 130/80.    3. Elevated LDL cholesterol level  We reviewed his cholesterol numbers from April.  His LDL is above goal.  We discussed considering a statin cholesterol medication.  He prefers to us avoid statins at this time.  He will work on healthier eating habits.  We will plan to recheck his fasting labs next visit for monitoring.    4. Presence of insulin pump        Return in about 6 months (around 7/9/2024) for Recheck, fasting.     This note was dictated using Dragon voice recognition.    Angela Rivera PA-C  01/09/2024

## 2024-01-18 ENCOUNTER — TELEPHONE (OUTPATIENT)
Dept: ENDOCRINOLOGY | Facility: CLINIC | Age: 54
End: 2024-01-18
Payer: COMMERCIAL

## 2024-01-18 NOTE — TELEPHONE ENCOUNTER
Patient called stated he is needing all of his pump supplies sent to [facility]. He is needing to discuss with our clinical staff because he is not sure we know what he is using? Please advise.

## 2024-01-19 RX ORDER — INSULIN PUMP CARTRIDGE
1 CARTRIDGE (EA) SUBCUTANEOUS
Qty: 30 EACH | Refills: 3 | Status: SHIPPED | OUTPATIENT
Start: 2024-01-19

## 2024-01-19 RX ORDER — INFUSION SET FOR INSULIN PUMP
1 INFUSION SETS-PARAPHERNALIA MISCELLANEOUS
Qty: 30 EACH | Refills: 3 | Status: SHIPPED | OUTPATIENT
Start: 2024-01-19

## 2024-01-26 DIAGNOSIS — E10.649 TYPE 1 DIABETES MELLITUS WITH HYPOGLYCEMIA AND WITHOUT COMA: ICD-10-CM

## 2024-01-26 RX ORDER — INSULIN GLARGINE 100 [IU]/ML
INJECTION, SOLUTION SUBCUTANEOUS
Qty: 15 ML | Refills: 0 | Status: SHIPPED | OUTPATIENT
Start: 2024-01-26

## 2024-01-26 RX ORDER — INSULIN PUMP CARTRIDGE
1 CARTRIDGE (EA) SUBCUTANEOUS
Qty: 30 EACH | Refills: 3 | Status: SHIPPED | OUTPATIENT
Start: 2024-01-26

## 2024-01-26 RX ORDER — INSULIN ASPART 100 [IU]/ML
INJECTION, SOLUTION INTRAVENOUS; SUBCUTANEOUS
Qty: 60 ML | Refills: 1 | Status: SHIPPED | OUTPATIENT
Start: 2024-01-26

## 2024-01-26 RX ORDER — INFUSION SET FOR INSULIN PUMP
1 INFUSION SETS-PARAPHERNALIA MISCELLANEOUS
Qty: 30 EACH | Refills: 3 | Status: SHIPPED | OUTPATIENT
Start: 2024-01-26

## 2024-01-26 RX ORDER — GLUCAGON INJECTION, SOLUTION 1 MG/.2ML
1 INJECTION, SOLUTION SUBCUTANEOUS ONCE AS NEEDED
Qty: 0.4 ML | Refills: 0 | Status: SHIPPED | OUTPATIENT
Start: 2024-01-26

## 2024-01-26 RX ORDER — PERPHENAZINE 16 MG/1
TABLET, FILM COATED ORAL
Qty: 100 EACH | Refills: 11 | Status: SHIPPED | OUTPATIENT
Start: 2024-01-26

## 2024-01-26 RX ORDER — PROCHLORPERAZINE 25 MG/1
SUPPOSITORY RECTAL
Qty: 9 EACH | Refills: 3 | Status: SHIPPED | OUTPATIENT
Start: 2024-01-26

## 2024-01-26 RX ORDER — PROCHLORPERAZINE 25 MG/1
1 SUPPOSITORY RECTAL
Qty: 1 EACH | Refills: 3 | Status: SHIPPED | OUTPATIENT
Start: 2024-01-26

## 2024-01-26 NOTE — TELEPHONE ENCOUNTER
Provider: KAYLYN FERNANDEZ     Caller: GILBERTO OROZCO     Relationship to Patient: SELF    Pharmacy: [facility]     Phone Number: [phone]    Reason for Call: PT IS NEEDING ALL HIS MEDICATIONS CHANGED TO [facility] DUE TO INSURANCE NovoLOG 100 UNIT/ML injection,Insulin Infusion Pump Supplies (AutoSoft XC Infusion Set) [id number] INCH Insulin Infusion Pump Supplies (T:slim X2 3mL Cartridge) misc, ,insulin glargine (Lantus) 100 UNIT/ML injection,    Glucagon (Gvoke HypoPen 2-Pack) 1 MG/0.2ML solution auto-injector   Contour Next Test test strip,    Continuous Blood Gluc Transmit (Dexcom G6 Transmitter) misc   Continuous Blood Gluc Sensor (Dexcom G6 Sensor) Blood Glucose Monitoring Suppl (Contour Next Link) w/Device kit PLEASE ADVISE AND CALL PT HE NEEDS HIS DEXCOM G6 SENSORS SENT THERE HE IS ABOUT OUT

## 2024-01-26 NOTE — TELEPHONE ENCOUNTER
Rx Refill Note  Requested Prescriptions     Pending Prescriptions Disp Refills    NovoLOG 100 UNIT/ML injection 60 mL 3     Sig: MAX DAILY DOSE 66 UNITS VIA INSULIN PUMP        Last office visit with prescribing clinician: 1/9/2024      Next office visit with prescribing clinician: 7/8/2024       Christie Doherty (Jodi)  01/26/24, 10:16 EST

## 2024-01-30 ENCOUNTER — TELEPHONE (OUTPATIENT)
Dept: ENDOCRINOLOGY | Facility: CLINIC | Age: 54
End: 2024-01-30
Payer: COMMERCIAL

## 2024-01-30 RX ORDER — INFUSION SET FOR INSULIN PUMP
1 INFUSION SETS-PARAPHERNALIA MISCELLANEOUS
Qty: 30 EACH | Refills: 3 | Status: SHIPPED | OUTPATIENT
Start: 2024-01-30

## 2024-03-18 ENCOUNTER — TELEPHONE (OUTPATIENT)
Dept: ENDOCRINOLOGY | Facility: CLINIC | Age: 54
End: 2024-03-18
Payer: COMMERCIAL

## 2024-03-18 NOTE — TELEPHONE ENCOUNTER
Patient called office, stated that [facility] out [facility] faxed over a request last week for patient to get a refill on his insulin infusion pump supplies. Patient was following up on this. [employer] was going to fax over request again today. He is almost out of his supplies. He would like a call once we receive the form.

## 2024-03-20 ENCOUNTER — TELEPHONE (OUTPATIENT)
Dept: ENDOCRINOLOGY | Facility: CLINIC | Age: 54
End: 2024-03-20
Payer: COMMERCIAL

## 2024-03-20 NOTE — TELEPHONE ENCOUNTER
Patient called office, was returning Dina call. Read him Dina message and informed him that form was received and placed in providers box. He would like a call back once form is faxed back.

## 2024-03-20 NOTE — TELEPHONE ENCOUNTER
Form received from [employer] and placed in providers box for signature.  Left message for patient to return call.

## 2024-07-08 ENCOUNTER — OFFICE VISIT (OUTPATIENT)
Dept: ENDOCRINOLOGY | Facility: CLINIC | Age: 54
End: 2024-07-08
Payer: COMMERCIAL

## 2024-07-08 VITALS
SYSTOLIC BLOOD PRESSURE: 128 MMHG | BODY MASS INDEX: 26.51 KG/M2 | OXYGEN SATURATION: 98 % | HEIGHT: 69 IN | WEIGHT: 179 LBS | HEART RATE: 60 BPM | DIASTOLIC BLOOD PRESSURE: 84 MMHG

## 2024-07-08 DIAGNOSIS — E10.649 TYPE 1 DIABETES MELLITUS WITH HYPOGLYCEMIA AND WITHOUT COMA: Primary | ICD-10-CM

## 2024-07-08 DIAGNOSIS — E78.00 ELEVATED LDL CHOLESTEROL LEVEL: ICD-10-CM

## 2024-07-08 DIAGNOSIS — Z96.41 PRESENCE OF INSULIN PUMP: ICD-10-CM

## 2024-07-08 LAB
ALBUMIN SERPL-MCNC: 4.2 G/DL (ref 3.5–5.2)
ALBUMIN UR-MCNC: <1.2 MG/DL
ALBUMIN/GLOB SERPL: 1.6 G/DL
ALP SERPL-CCNC: 59 U/L (ref 39–117)
ALT SERPL W P-5'-P-CCNC: 32 U/L (ref 1–41)
ANION GAP SERPL CALCULATED.3IONS-SCNC: 10 MMOL/L (ref 5–15)
AST SERPL-CCNC: 43 U/L (ref 1–40)
BILIRUB SERPL-MCNC: 0.9 MG/DL (ref 0–1.2)
BUN SERPL-MCNC: 21 MG/DL (ref 6–20)
BUN/CREAT SERPL: 17.9 (ref 7–25)
CALCIUM SPEC-SCNC: 9.2 MG/DL (ref 8.6–10.5)
CHLORIDE SERPL-SCNC: 100 MMOL/L (ref 98–107)
CHOLEST SERPL-MCNC: 173 MG/DL (ref 0–200)
CO2 SERPL-SCNC: 30 MMOL/L (ref 22–29)
CREAT SERPL-MCNC: 1.17 MG/DL (ref 0.76–1.27)
CREAT UR-MCNC: 114.9 MG/DL
EGFRCR SERPLBLD CKD-EPI 2021: 74.5 ML/MIN/1.73
EXPIRATION DATE: ABNORMAL
GLOBULIN UR ELPH-MCNC: 2.7 GM/DL
GLUCOSE SERPL-MCNC: 64 MG/DL (ref 65–99)
HBA1C MFR BLD: 5.8 % (ref 4.5–5.7)
HDLC SERPL-MCNC: 65 MG/DL (ref 40–60)
LDLC SERPL CALC-MCNC: 98 MG/DL (ref 0–100)
LDLC/HDLC SERPL: 1.51 {RATIO}
Lab: ABNORMAL
MICROALBUMIN/CREAT UR: NORMAL MG/G{CREAT}
POTASSIUM SERPL-SCNC: 3.8 MMOL/L (ref 3.5–5.2)
PROT SERPL-MCNC: 6.9 G/DL (ref 6–8.5)
SODIUM SERPL-SCNC: 140 MMOL/L (ref 136–145)
TRIGL SERPL-MCNC: 50 MG/DL (ref 0–150)
TSH SERPL DL<=0.05 MIU/L-ACNC: 4.17 UIU/ML (ref 0.27–4.2)
VLDLC SERPL-MCNC: 10 MG/DL (ref 5–40)

## 2024-07-08 PROCEDURE — 83036 HEMOGLOBIN GLYCOSYLATED A1C: CPT | Performed by: PHYSICIAN ASSISTANT

## 2024-07-08 PROCEDURE — 95251 CONT GLUC MNTR ANALYSIS I&R: CPT | Performed by: PHYSICIAN ASSISTANT

## 2024-07-08 PROCEDURE — 99214 OFFICE O/P EST MOD 30 MIN: CPT | Performed by: PHYSICIAN ASSISTANT

## 2024-07-08 PROCEDURE — 80053 COMPREHEN METABOLIC PANEL: CPT | Performed by: PHYSICIAN ASSISTANT

## 2024-07-08 PROCEDURE — 84443 ASSAY THYROID STIM HORMONE: CPT | Performed by: PHYSICIAN ASSISTANT

## 2024-07-08 PROCEDURE — 82043 UR ALBUMIN QUANTITATIVE: CPT | Performed by: PHYSICIAN ASSISTANT

## 2024-07-08 PROCEDURE — 80061 LIPID PANEL: CPT | Performed by: PHYSICIAN ASSISTANT

## 2024-07-08 PROCEDURE — 82570 ASSAY OF URINE CREATININE: CPT | Performed by: PHYSICIAN ASSISTANT

## 2024-07-08 RX ORDER — TESTOSTERONE CYPIONATE 200 MG/ML
VIAL (ML) INTRAMUSCULAR
COMMUNITY
Start: 2024-06-20

## 2024-07-08 RX ORDER — TADALAFIL 5 MG/1
TABLET ORAL DAILY PRN
COMMUNITY
Start: 2024-05-07

## 2024-07-08 NOTE — PROGRESS NOTES
"     Office Note      Date: 2024  Patient Name: Stuart Guerrero  MRN: [id number]  : 1970    Chief Complaint   Patient presents with    Diabetes       History of Present Illness:   Stuart Guerrero is a 54 y.o. male who presents for follow-up for type 1 diabetes diagnosed in .  He remains on the tandem insulin pump with Dexcom G6 continuous glucose monitor.  He has not updated to the G7 sensor yet.  He reports overall things seem to be going well.  He reports he does have some high and low blood sugars and makes frequent adjustments to his pump settings but overall is doing well.  He denies severe or frequent hypoglycemia.  He reports he is due for his eye exam and will get this scheduled.  He is fasting today for labs.  He denies any trouble with his feet today.  He has not been checking his blood pressure very regularly recently but reports overall when he checks it it is okay.  He has been working out more recently is on a testosterone supplement and overall feels well.      Subjective     Review of Systems:   Review of Systems   Constitutional: Negative.    Cardiovascular: Negative.    Gastrointestinal: Negative.    Endocrine: Negative.    Neurological: Negative.        The following portions of the patient's history were reviewed and updated as appropriate: allergies, current medications, past family history, past medical history, past social history, past surgical history, and problem list.    Objective     Vitals:    24 0742   BP: 128/84   Pulse: 60   SpO2: 98%   Weight: 81.2 kg (179 lb)   Height: 175.3 cm (69\")     Body mass index is 26.43 kg/m².    Physical Exam  Vitals reviewed.   Constitutional:       General: He is not in acute distress.     Appearance: Normal appearance.   Cardiovascular:      Pulses:           Dorsalis pedis pulses are 2+ on the right side and 2+ on the left side.        Posterior tibial pulses are 2+ on the right side and 2+ on the left side. "   Musculoskeletal:      Right foot: No deformity.      Left foot: No deformity.   Feet:      Right foot:      Protective Sensation: 5 sites tested.  5 sites sensed.      Skin integrity: Skin integrity normal.      Toenail Condition: Right toenails are normal.      Left foot:      Protective Sensation: 5 sites tested.  5 sites sensed.      Skin integrity: Skin integrity normal.      Toenail Condition: Left toenails are normal.      Comments: Diabetic Foot Exam Performed and Monofilament Test Performed      Neurological:      Mental Status: He is alert.         HEMOGLOBIN A1C  Lab Results   Component Value Date    HGBA1C 5.8 (A) 07/08/2024       GLUCOSE  Glucose   Date Value Ref Range Status   01/09/2024 96 70 - 130 mg/dL Final       CMP  Lab Results   Component Value Date    GLUCOSE 116 (H) 04/18/2023    BUN 21 (H) 04/18/2023    CREATININE 1.03 04/18/2023    EGFRIFNONA 69 12/06/2021    BCR 20.4 04/18/2023    K 4.6 04/18/2023    CO2 26.6 04/18/2023    CALCIUM 9.5 04/18/2023    AST 29 04/18/2023    ALT 22 04/18/2023       LIPID PANEL  Lab Results   Component Value Date    CHOL 183 04/18/2023    TRIG 58 04/18/2023    HDL 54 04/18/2023     (H) 04/18/2023       URINE MICROALBUMIN/CREATININE RATIO  Microalbumin/Creatinine Ratio   Date Value Ref Range Status   04/18/2023   Final     Comment:     Unable to calculate       TSH  Lab Results   Component Value Date    TSH 2.070 04/18/2023       Assessment / Plan      Assessment & Plan:  1. Type 1 diabetes mellitus with hypoglycemia and without coma  His hemoglobin A1c today is excellent at 5.8%.  I reviewed his tandem insulin pump download with continuous glucose monitor readings.  His average glucose is 127 mg/dL with 88% of his readings within range, 9.3% high, 1.2% very high, 0.8% low and 0.3% very low.  We did not make any adjustments to his pump settings today.  I gave him a sample Dexcom G7 to try.  He will reach out to me if he wants a prescription for this.  He  has had some issues with the pump not being accurate about the volume of insulin that is in it and he has contacted tandem.  I encouraged him to get his eye exam scheduled.  Fasting labs pending today.  Will send note with results and plan.  His blood pressure has improved as compared to last visit but his diastolic blood pressure is slightly elevated.  We discussed that if his urine microalbumin/creatinine ratio is elevated we may want to consider low-dose blood pressure medicine for kidney protection.  He prefers to avoid additional medication at this time.  - POC Glycosylated Hemoglobin (Hb A1C)  - TSH; Future  - Comprehensive Metabolic Panel; Future  - Microalbumin / Creatinine Urine Ratio - Urine, Clean Catch; Future  - Lipid Panel; Future    2. Elevated LDL cholesterol level  He has had elevated LDL cholesterol in the past but prefers to avoid statins.  Fasting labs pending today.  Will send note with results and plan.  - Lipid Panel; Future    3. Presence of insulin pump        Return in about 4 months (around 11/8/2024) for Recheck, may want to consider [place].     This note was dictated using Dragon voice recognition.    Electronically signed by: REBECCA Conteh  07/08/2024

## 2024-07-23 ENCOUNTER — TELEPHONE (OUTPATIENT)
Dept: ENDOCRINOLOGY | Facility: CLINIC | Age: 54
End: 2024-07-23
Payer: COMMERCIAL

## 2024-07-23 NOTE — TELEPHONE ENCOUNTER
PLEASE CALL ASAP [phone]  THIS AM HE FELT LIKE HIS BLOOD SUGARS WAS GOING TO BOTTOM OUT-HIS DEXCOM SAID HIS BLOOD SUGAR , HE CHECKED IT WITH HIS METER AND IT WAS OFF 20 POINTS  HE HAS NEVER FELT LIKE THIS BEFORE  PLEASE CALL ASAP

## 2024-07-23 NOTE — TELEPHONE ENCOUNTER
Returned call, pt has had steadily increasing glucose levels overnight despite repeat bolusing. He has changed his infusion set and cartridge but glucose has continued to rise. When he was feeling symptomatic of low BG he did treat with soda and PB crackers. Afterwards he checked SMBG and Dexcom and they are reading within 20pts and he realized his glucose was likely not low.   BG is now almost 400 and rising, over 9U on board from multiple bolus attempts in the past hour.  Advised pt to suspend insulin and disconnect from the pump. Take correction bolus with syringe and wait a couple hours watching glucose trends before resuming pump therapy.

## 2025-01-09 ENCOUNTER — OFFICE VISIT (OUTPATIENT)
Dept: ENDOCRINOLOGY | Facility: CLINIC | Age: 55
End: 2025-01-09
Payer: COMMERCIAL

## 2025-01-09 VITALS
WEIGHT: 186.8 LBS | HEART RATE: 70 BPM | BODY MASS INDEX: 27.67 KG/M2 | HEIGHT: 69 IN | OXYGEN SATURATION: 95 % | SYSTOLIC BLOOD PRESSURE: 138 MMHG | DIASTOLIC BLOOD PRESSURE: 88 MMHG

## 2025-01-09 DIAGNOSIS — E10.649 TYPE 1 DIABETES MELLITUS WITH HYPOGLYCEMIA AND WITHOUT COMA: Primary | ICD-10-CM

## 2025-01-09 DIAGNOSIS — R03.0 ELEVATED BLOOD PRESSURE READING: ICD-10-CM

## 2025-01-09 LAB
ALBUMIN SERPL-MCNC: 3.9 G/DL (ref 3.5–5.2)
ALBUMIN/GLOB SERPL: 1.2 G/DL
ALP SERPL-CCNC: 64 U/L (ref 39–117)
ALT SERPL W P-5'-P-CCNC: 27 U/L (ref 1–41)
ANION GAP SERPL CALCULATED.3IONS-SCNC: 12.6 MMOL/L (ref 5–15)
AST SERPL-CCNC: 36 U/L (ref 1–40)
BILIRUB SERPL-MCNC: 0.8 MG/DL (ref 0–1.2)
BUN SERPL-MCNC: 21 MG/DL (ref 6–20)
BUN/CREAT SERPL: 14.6 (ref 7–25)
CALCIUM SPEC-SCNC: 9.1 MG/DL (ref 8.6–10.5)
CHLORIDE SERPL-SCNC: 100 MMOL/L (ref 98–107)
CO2 SERPL-SCNC: 26.4 MMOL/L (ref 22–29)
CREAT SERPL-MCNC: 1.44 MG/DL (ref 0.76–1.27)
EGFRCR SERPLBLD CKD-EPI 2021: 58.1 ML/MIN/1.73
EXPIRATION DATE: ABNORMAL
EXPIRATION DATE: NORMAL
GLOBULIN UR ELPH-MCNC: 3.3 GM/DL
GLUCOSE BLDC GLUCOMTR-MCNC: 113 MG/DL (ref 70–130)
GLUCOSE SERPL-MCNC: 81 MG/DL (ref 65–99)
HBA1C MFR BLD: 6.1 % (ref 4.5–5.7)
Lab: ABNORMAL
Lab: NORMAL
POTASSIUM SERPL-SCNC: 4.1 MMOL/L (ref 3.5–5.2)
PROT SERPL-MCNC: 7.2 G/DL (ref 6–8.5)
SODIUM SERPL-SCNC: 139 MMOL/L (ref 136–145)

## 2025-01-09 PROCEDURE — 80053 COMPREHEN METABOLIC PANEL: CPT | Performed by: PHYSICIAN ASSISTANT

## 2025-01-09 RX ORDER — INSULIN ASPART 100 [IU]/ML
INJECTION, SOLUTION INTRAVENOUS; SUBCUTANEOUS
Qty: 60 ML | Refills: 1 | Status: SHIPPED | OUTPATIENT
Start: 2025-01-09

## 2025-01-09 RX ORDER — ACYCLOVIR 400 MG/1
1 TABLET ORAL
Qty: 9 EACH | Refills: 3 | Status: SHIPPED | OUTPATIENT
Start: 2025-01-09

## 2025-01-09 RX ORDER — LISINOPRIL 5 MG/1
5 TABLET ORAL DAILY
Qty: 90 TABLET | Refills: 1 | Status: SHIPPED | OUTPATIENT
Start: 2025-01-09 | End: 2026-01-09

## 2025-01-09 NOTE — PROGRESS NOTES
"     Office Note      Date: 2025  Patient Name: Stuart Guerrero  MRN: [id number]  : 1970    Chief Complaint   Patient presents with    Diabetes     Type I       History of Present Illness:   Stuart Guerrero is a 54 y.o. male who presents for follow-up for type 1 diabetes diagnosed in .  He remains on the tandem insulin pump with Dexcom G6 continuous glucose monitor.  He has not updated his pump to use the G7 yet but plans to do this in the near future.  He is asking about a prescription for the G7 today.  He reports he has noted some higher blood sugar readings after he eats.  He often gives multiple boluses and then he does have a low blood glucose.  He reports he often boluses right after he eats.  He is overdue for his eye exam and will call to get one scheduled.  He denies any trouble with his feet today.  We did his foot exam at his appointment in July as well as fasting labs.  He reports when he checks his blood pressure at home it has been running higher.  He reports he has been under increased stress and feels this is caused the elevation in blood pressure.      Subjective     Review of Systems:   Review of Systems   Constitutional: Negative.    Cardiovascular: Negative.    Gastrointestinal: Negative.    Endocrine: Negative.    Neurological: Negative.        The following portions of the patient's history were reviewed and updated as appropriate: allergies, current medications, past family history, past medical history, past social history, past surgical history, and problem list.    Objective     Vitals:    25 0805   BP: 138/88   BP Location: Left arm   Patient Position: Sitting   Cuff Size: Adult   Pulse: 70   SpO2: 95%   Weight: 84.7 kg (186 lb 12.8 oz)   Height: 175.3 cm (69.02\")     Body mass index is 27.57 kg/m².    Physical Exam  Vitals reviewed.   Constitutional:       General: He is not in acute distress.     Appearance: Normal appearance.   Neurological:      Mental " Status: He is alert.         HEMOGLOBIN A1C  Lab Results   Component Value Date    HGBA1C 6.1 (A) 01/09/2025       GLUCOSE  Glucose   Date Value Ref Range Status   01/09/2025 113 70 - 130 mg/dL Final       CMP  Lab Results   Component Value Date    GLUCOSE 64 (L) 07/08/2024    BUN 21 (H) 07/08/2024    CREATININE 1.17 07/08/2024    EGFRIFNONA 69 12/06/2021    BCR 17.9 07/08/2024    K 3.8 07/08/2024    CO2 30.0 (H) 07/08/2024    CALCIUM 9.2 07/08/2024    AST 43 (H) 07/08/2024    ALT 32 07/08/2024       LIPID PANEL  Lab Results   Component Value Date    CHOL 173 07/08/2024    TRIG 50 07/08/2024    HDL 65 (H) 07/08/2024    LDL 98 07/08/2024       URINE MICROALBUMIN/CREATININE RATIO  Microalbumin/Creatinine Ratio   Date Value Ref Range Status   07/08/2024   Final     Comment:     Unable to calculate       TSH  Lab Results   Component Value Date    TSH 4.170 07/08/2024       Assessment / Plan      Assessment & Plan:  1. Type 1 diabetes mellitus with hypoglycemia and without coma  His hemoglobin A1c is up some as compared to July but still to goal at 6.1%.  I reviewed his tandem insulin pump download.  His average glucose for the last 2 weeks is 120 mg/dL with 84% of his readings within range, 6.8% high, 8.4% very high, 5.8% low and 2.6% very low.  We discussed bolusing prior to eating to see if this helps prevent the spike in blood glucose.  We also discussed limiting over bolusing as this tends to cause hypoglycemia for him.  His weight is up 7 pounds since his appointment in July.  I encouraged continued healthy eating habits and physical activity as tolerated.  His AST was mildly elevated last visit.  CMP pending today.  Will send note with results and plan.  I refilled his insulin today.  I sent a prescription for the Dexcom G7 to his pharmacy.  He will update his insulin pump in the next few weeks.  He still has a few G6 sensors remaining.  I encouraged him to get his eye exam scheduled.  We will plan to do fasting  labs as well as his foot exam next visit for monitoring.  - POC Glucose, Blood  - POC Glycosylated Hemoglobin (Hb A1C)  - NovoLOG 100 UNIT/ML injection; MAX DAILY DOSE 66 UNITS VIA INSULIN PUMP  Dispense: 60 mL; Refill: 1  - Comprehensive Metabolic Panel; Future  - Comprehensive Metabolic Panel    2. Elevated blood pressure reading  His blood pressure is up some today and was higher than goal last visit.  We will add low-dose lisinopril 5 mg daily.  I sent a prescription for this to his pharmacy.      Return in 6 months (on 7/9/2025) for Recheck. keep July appt, fasting.     This note was dictated using Dragon voice recognition.    Electronically signed by: REBECCA Conteh  01/09/2025

## 2025-07-05 DIAGNOSIS — E10.649 TYPE 1 DIABETES MELLITUS WITH HYPOGLYCEMIA AND WITHOUT COMA: ICD-10-CM

## 2025-07-07 RX ORDER — INSULIN ASPART 100 [IU]/ML
INJECTION, SOLUTION INTRAVENOUS; SUBCUTANEOUS
Qty: 60 ML | Refills: 1 | Status: SHIPPED | OUTPATIENT
Start: 2025-07-07

## 2025-07-07 NOTE — TELEPHONE ENCOUNTER
Rx Refill Note  Requested Prescriptions     Pending Prescriptions Disp Refills    NovoLOG 100 UNIT/ML injection [Pharmacy Med Name: NovoLOG 100 UNIT/ML Injection Solution] 60 mL 1     Sig: INJECT SUBCUTANEOUSLY MAX DAILY  DOSE OF 66 UNITS VIA INSULIN  PUMP      Last office visit with prescribing clinician: 1/9/2025     Next office visit with prescribing clinician: 10/28/2025       Ying Sierra  07/07/25, 11:48 EDT